# Patient Record
Sex: FEMALE | Race: WHITE | Employment: UNEMPLOYED | URBAN - METROPOLITAN AREA
[De-identification: names, ages, dates, MRNs, and addresses within clinical notes are randomized per-mention and may not be internally consistent; named-entity substitution may affect disease eponyms.]

---

## 2017-12-16 ENCOUNTER — TELEPHONE (OUTPATIENT)
Dept: PEDIATRICS | Facility: CLINIC | Age: 22
End: 2017-12-16

## 2018-01-15 ENCOUNTER — OFFICE VISIT (OUTPATIENT)
Dept: FAMILY MEDICINE | Facility: CLINIC | Age: 23
End: 2018-01-15
Payer: COMMERCIAL

## 2018-01-15 VITALS
TEMPERATURE: 99.4 F | WEIGHT: 132.8 LBS | HEART RATE: 88 BPM | HEIGHT: 59 IN | BODY MASS INDEX: 26.77 KG/M2 | DIASTOLIC BLOOD PRESSURE: 60 MMHG | SYSTOLIC BLOOD PRESSURE: 98 MMHG

## 2018-01-15 DIAGNOSIS — F43.22 ADJUSTMENT DISORDER WITH ANXIOUS MOOD: ICD-10-CM

## 2018-01-15 DIAGNOSIS — R53.82 CHRONIC FATIGUE: Primary | ICD-10-CM

## 2018-01-15 DIAGNOSIS — F43.21 ADJUSTMENT DISORDER WITH DEPRESSED MOOD: ICD-10-CM

## 2018-01-15 DIAGNOSIS — R20.0 NUMBNESS OF TOES: ICD-10-CM

## 2018-01-15 DIAGNOSIS — R41.840 DIFFICULTY CONCENTRATING: ICD-10-CM

## 2018-01-15 LAB — T4 FREE SERPL-MCNC: 0.83 NG/DL (ref 0.76–1.46)

## 2018-01-15 PROCEDURE — 84443 ASSAY THYROID STIM HORMONE: CPT | Performed by: NURSE PRACTITIONER

## 2018-01-15 PROCEDURE — 86800 THYROGLOBULIN ANTIBODY: CPT | Performed by: NURSE PRACTITIONER

## 2018-01-15 PROCEDURE — 82306 VITAMIN D 25 HYDROXY: CPT | Performed by: NURSE PRACTITIONER

## 2018-01-15 PROCEDURE — 99214 OFFICE O/P EST MOD 30 MIN: CPT | Performed by: NURSE PRACTITIONER

## 2018-01-15 PROCEDURE — 84480 ASSAY TRIIODOTHYRONINE (T3): CPT | Performed by: NURSE PRACTITIONER

## 2018-01-15 PROCEDURE — 36415 COLL VENOUS BLD VENIPUNCTURE: CPT | Performed by: NURSE PRACTITIONER

## 2018-01-15 PROCEDURE — 84439 ASSAY OF FREE THYROXINE: CPT | Performed by: NURSE PRACTITIONER

## 2018-01-15 PROCEDURE — 84481 FREE ASSAY (FT-3): CPT | Performed by: NURSE PRACTITIONER

## 2018-01-15 ASSESSMENT — PATIENT HEALTH QUESTIONNAIRE - PHQ9
5. POOR APPETITE OR OVEREATING: SEVERAL DAYS
SUM OF ALL RESPONSES TO PHQ QUESTIONS 1-9: 14

## 2018-01-15 ASSESSMENT — ANXIETY QUESTIONNAIRES
2. NOT BEING ABLE TO STOP OR CONTROL WORRYING: MORE THAN HALF THE DAYS
1. FEELING NERVOUS, ANXIOUS, OR ON EDGE: NEARLY EVERY DAY
GAD7 TOTAL SCORE: 10
5. BEING SO RESTLESS THAT IT IS HARD TO SIT STILL: NOT AT ALL
7. FEELING AFRAID AS IF SOMETHING AWFUL MIGHT HAPPEN: MORE THAN HALF THE DAYS
6. BECOMING EASILY ANNOYED OR IRRITABLE: NOT AT ALL
3. WORRYING TOO MUCH ABOUT DIFFERENT THINGS: MORE THAN HALF THE DAYS

## 2018-01-15 NOTE — MR AVS SNAPSHOT
After Visit Summary   1/15/2018    Lori Velásquez    MRN: [id number]           Patient Information     Date Of Birth          1995        Visit Information        Provider Department      1/15/2018 3:00 PM Pauline Sanchez APRN [facility]        Today's Diagnoses     Chronic fatigue    -  1    Numbness of toes        Adjustment disorder with depressed mood        Adjustment disorder with anxious mood        Difficulty concentrating          Care Instructions    For the numb feeling of the right great toe.     do some back exercises,   Does have  Toe exercises. Curling the toes.  Stretching,     Do some low back exercises, try to NOT sleep on your stomach.  Hug a pillow to keep you off your stomach.       For the anxiety   -- figure out the  Triggers for your anxiety and then figure out coping mechanisms     I feel it would be good for you to see a counselor .  I did give you a referral to  [facility]   You will need to call for an appointment     Did check the full thyroid panel   Results should be back by the end of the week                Follow-ups after your visit        Additional Services     PSYCHOLOGY REFERRAL       Your provider has referred you to:  [facility]    [facility] [address] [address]  [phone]      Please be aware that coverage of these services is subject to the terms and limitations of your health insurance plan.  Call member services at your health plan with any benefit or coverage questions.      Please bring the following to your appointment:    >>   Any x-rays, CTs or MRIs which have been performed.  Contact the facility where they were done to arrange for  prior to your scheduled appointment.   >>   List of current medications   >>   This referral request   >>   Any documents/labs given to you for this referral                  Who to contact     Normal or non-critical lab and imaging  "results will be communicated to you by MyChart, letter or phone within 4 business days after the clinic has received the results. If you do not hear from us within 7 days, please contact the clinic through [employer] or phone. If you have a critical or abnormal lab result, we will notify you by phone as soon as possible.  Submit refill requests through [employer] or call your pharmacy and they will forward the refill request to us. Please allow 3 business days for your refill to be completed.          If you need to speak with a  for additional information , please call: [phone]           Additional Information About Your Visit        Smart Adventure Information     [employer] lets you send messages to your doctor, view your test results, renew your prescriptions, schedule appointments and more. To sign up, go to [email or web address] . Click on \"Log in\" on the left side of the screen, which will take you to the Welcome page. Then click on \"Sign up Now\" on the right side of the page.     You will be asked to enter the access code listed below, as well as some personal information. Please follow the directions to create your username and password.     Your access code is: [email or web address]  Expires: 4/15/2018  4:04 PM     Your access code will  in 90 days. If you need help or a new code, please call your [place] clinic or [phone].        Care EveryWhere ID     This is your Care EveryWhere ID. This could be used by other organizations to access your [place] medical records  [id number]        Your Vitals Were     Pulse Temperature Height Last Period Breastfeeding? BMI (Body Mass Index)    88 99.4  F (37.4  C) (Tympanic) 4' 11.49\" (1.511 m) 2018 (Within Days) No 26.38 kg/m2       Blood Pressure from Last 3 Encounters:   01/15/18 98/60   07/28/15 104/74   10/20/11 100/64    Weight from Last 3 Encounters:   01/15/18 132 lb 12.8 oz (60.2 kg)   07/28/15 134 lb 6 oz (61 kg) (66 %)*   10/20/11 114 lb " 6.4 oz (51.9 kg) (52 %)*     * Growth percentiles are based on [facility] 2-20 Years data.              We Performed the Following     Anti thyroglobulin antibody     PSYCHOLOGY REFERRAL     T3, Free     T3, total     T4, free     TSH     Vitamin D Deficiency        Primary Care Provider Office Phone # Fax #    Marta Zuleta MD PhD [phone] [address] [facility]   [place] [address]        Equal Access to Services     [facility]: Hadii aad ku hadasho Soomaali, waaxda luqadaha, qaybta kaalmada adeegyada, waxay idiin hayaan adeeg khkeonsandra laisaías . So [facility] [phone].    ATENCIÓN: Si habla español, tiene a moreno disposición servicios gratuitos de asistencia lingüística. Llame al [phone].    We comply with applicable federal civil rights laws and [place] laws. We do not discriminate on the basis of race, color, national origin, age, disability, sex, sexual orientation, or gender identity.            Thank you!     Thank you for choosing [facility]  for your care. Our goal is always to provide you with excellent care. Hearing back from our patients is one way we can continue to improve our services. Please take a few minutes to complete the written survey that you may receive in the mail after your visit with us. Thank you!             Your Updated Medication List - Protect others around you: Learn how to safely use, store and throw away your medicines at www.disposemymeds.org.          This list is accurate as of: 1/15/18  4:04 PM.  Always use your most recent med list.                   Brand Name Dispense Instructions for use Diagnosis    NO ACTIVE MEDICATIONS      .

## 2018-01-15 NOTE — LETTER
January 18, 2018      Lori Velásquez  [address] [place] [address]        Dear ,    The results of your recent checks for thyroid function and body metabolism were normal.     The results of your recent Vitamin D were abnormal. Please get over the counter Vitamin D 5000 units and take this daily for the next 3-4 months, and then decrease to 2000 units daily.     Component      Latest Ref Rng & Units 1/15/2018   TSH      0.40 - 4.00 mU/L 2.96   T4 Free      0.76 - 1.46 ng/dL 0.83   Free T3      2.3 - 4.2 pg/mL 2.3   Triiodothyronine (T3)      60 - 181 ng/dL 98   Thyroglobulin Antibody      <40 IU/mL <20   Vitamin D Deficiency screening      20 - 75 ug/L 22       If you have any questions or concerns, please call the clinic at the number listed above.       Sincerely,        DIANA SOSA NP, APRN CNP / jg

## 2018-01-15 NOTE — PATIENT INSTRUCTIONS
For the numb feeling of the right great toe.     do some back exercises,   Does have  Toe exercises. Curling the toes.  Stretching,     Do some low back exercises, try to NOT sleep on your stomach.  Hug a pillow to keep you off your stomach.       For the anxiety   -- figure out the  Triggers for your anxiety and then figure out coping mechanisms     I feel it would be good for you to see a counselor .  I did give you a referral to  [facility]   You will need to call for an appointment     Did check the full thyroid panel   Results should be back by the end of the week

## 2018-01-16 LAB
DEPRECATED CALCIDIOL+CALCIFEROL SERPL-MC: 22 UG/L (ref 20–75)
T3 SERPL-MCNC: 98 NG/DL (ref 60–181)
T3FREE SERPL-MCNC: 2.3 PG/ML (ref 2.3–4.2)
THYROGLOB AB SERPL IA-ACNC: <20 IU/ML (ref 0–40)
TSH SERPL DL<=0.005 MIU/L-ACNC: 2.96 MU/L (ref 0.4–4)

## 2018-01-16 ASSESSMENT — ANXIETY QUESTIONNAIRES: GAD7 TOTAL SCORE: 10

## 2018-02-17 ENCOUNTER — HEALTH MAINTENANCE LETTER (OUTPATIENT)
Age: 23
End: 2018-02-17

## 2018-03-10 ENCOUNTER — HEALTH MAINTENANCE LETTER (OUTPATIENT)
Age: 23
End: 2018-03-10

## 2018-03-24 ENCOUNTER — HEALTH MAINTENANCE LETTER (OUTPATIENT)
Age: 23
End: 2018-03-24

## 2018-04-22 ENCOUNTER — VIRTUAL VISIT (OUTPATIENT)
Dept: FAMILY MEDICINE | Facility: OTHER | Age: 23
End: 2018-04-22

## 2018-04-22 NOTE — PROGRESS NOTES
"Date:   Clinician: Phoenix Taveras  Clinician NPI: [id number]  Patient: Lori Velásquez  Patient : 1995  Patient Address: [address] [place], [place] [address]  Patient Phone: [phone]  Visit Protocol: Ear pain  Patient Summary:  Lori is a 23 year old ( : 1995 ) female who initiated a Visit for swimmer's ear (ear pain). When asked the question \"Please sign me up to receive news, health information and promotions from [employer].\", Lori responded \"No\".    Lori uploaded images of her ear(s).   Lori reports that her ear pain started 5-7 days ago. The ear pain is located inside both ears.   In addition to the ear pain, Lori is experiencing a feeling of fullness in the ear(s). Lori reports having fluid draining from the ear(s).   Symptom Details     Pain: Lori is experiencing mild pain. It does not get worse when she gently pulls on the earlobe(s) and eats or chews.     Drainage: The color of the fluid coming out of her ear(s) is yellow, white, and clear. The fluid does not have a bad odor.      Lori denies itchiness, redness, and tenderness in the ear(s). She also denies feeling feverish, recent injuries near the ear(s), ever having ear tubes, and the possibility of a foreign object in the ear(s). Lori denies flying and swimming within the past week.    Weight: 135 lbs   She denies pregnancy and denies breastfeeding. She has menstruated in the past month.   She does not smoke or use smokeless tobacco.   Additional health information pertinent to this Visit as reported by the patient (free text): The ear pain started with an episode at work on the  where I felt feverish and faint/dizzy and I could pop my ears but the mild pressure would return instantly. It lasted about 20 minutes and resolved itself within a minute or 2. The same thing happened on the  and hasn't happened since then. Since then my eardrums have had a dull aching " feeling and there is some fluid but only at intervals and not enough to be bothersome. The left started hurting more than the right this afternoon.  MEDICATIONS:    Vitamin D3 oral  , ALLERGIES:   NKDA    Clinician Response:  Dear Lori,   I am sorry you are not feeling well. To determine the most appropriate care for you, I would like you to be seen in person to further discuss your health history and symptoms.  You will not be charged for this Visit. Thank you for trusting us with your care.   Diagnosis: Refer for additional evaluation  Diagnosis ICD: R69  Additional Clinician Notes: Drainage could indicate injury to the ear drums and should be directly evaluated in the office.

## 2018-04-23 ENCOUNTER — OFFICE VISIT (OUTPATIENT)
Dept: FAMILY MEDICINE | Facility: CLINIC | Age: 23
End: 2018-04-23
Payer: COMMERCIAL

## 2018-04-23 VITALS
TEMPERATURE: 98.5 F | BODY MASS INDEX: 27.3 KG/M2 | DIASTOLIC BLOOD PRESSURE: 66 MMHG | SYSTOLIC BLOOD PRESSURE: 110 MMHG | HEART RATE: 96 BPM | WEIGHT: 135.4 LBS | HEIGHT: 59 IN

## 2018-04-23 DIAGNOSIS — H69.93 DYSFUNCTION OF BOTH EUSTACHIAN TUBES: Primary | ICD-10-CM

## 2018-04-23 PROCEDURE — 99213 OFFICE O/P EST LOW 20 MIN: CPT | Performed by: FAMILY MEDICINE

## 2018-04-23 NOTE — MR AVS SNAPSHOT
"              After Visit Summary   4/23/2018    Lori Velásquez    MRN: [id number]           Patient Information     Date Of Birth          1995        Visit Information        Provider Department      4/23/2018 6:40 PM Zoë Dasilva, DO [facility]        Today's Diagnoses     Dysfunction of both eustachian tubes    -  1      Care Instructions    Looks like a congestion around the pressure equalizing tubes causing the ear symptoms.    No sign of infection.  You can try some flonase or nasonex nasal spray along with Sudafed if you would like.  Symptoms might take a few weeks to resolve.    If you have worsening ear pain or develop fever please seek additional care            Follow-ups after your visit        Who to contact     Normal or non-critical lab and imaging results will be communicated to you by [employer], letter or phone within 4 business days after the clinic has received the results. If you do not hear from us within 7 days, please contact the clinic through [employer] or phone. If you have a critical or abnormal lab result, we will notify you by phone as soon as possible.  Submit refill requests through [employer] or call your pharmacy and they will forward the refill request to us. Please allow 3 business days for your refill to be completed.          If you need to speak with a  for additional information , please call: [phone]           Additional Information About Your Visit        [employer] Information     [employer] lets you send messages to your doctor, view your test results, renew your prescriptions, schedule appointments and more. To sign up, go to [email or web address] . Click on \"Log in\" on the left side of the screen, which will take you to the Welcome page. Then click on \"Sign up Now\" on the right side of the page.     You will be asked to enter the access code listed below, as well as some personal information. Please follow the directions to create " "your username and password.     Your access code is: [email or web address]  Expires: 2018  7:18 PM     Your access code will  in 90 days. If you need help or a new code, please call your [facility] clinic or [phone].        Care EveryWhere ID     This is your Care EveryWhere ID. This could be used by other organizations to access your [facility] medical records  [id number]        Your Vitals Were     Pulse Temperature Height Breastfeeding? BMI (Body Mass Index)       96 98.5  F (36.9  C) (Tympanic) 4' 11\" (1.499 m) No 27.35 kg/m2        Blood Pressure from Last 3 Encounters:   18 110/66   01/15/18 98/60   07/28/15 104/74    Weight from Last 3 Encounters:   18 135 lb 6.4 oz (61.4 kg)   01/15/18 132 lb 12.8 oz (60.2 kg)   07/28/15 134 lb 6 oz (61 kg) (66 %)*     * Growth percentiles are based on CDC 2-20 Years data.              Today, you had the following     No orders found for display       Primary Care Provider Office Phone # Fax #    Marta Zuleta MD PhD [phone] [address] [place] [address]        Equal Access to Services     [facility]: Hadii wilson contreras hadasho Soomaali, waaxda luqadaha, qaybta kaalmada adeegyada, camryn chan hayleigha mckay . So [facility] [place] [facility] [phone].    ATENCIÓN: Si habla español, tiene a moreno disposición servicios gratuitos de asistencia lingüística. Llame al [phone].    We comply with applicable federal civil rights laws and [place] laws. We do not discriminate on the basis of race, color, national origin, age, disability, sex, sexual orientation, or gender identity.            Thank you!     Thank you for choosing [facility] [place]  for your care. Our goal is always to provide you with excellent care. Hearing back from our patients is one way we can continue to improve our services. Please take a few minutes to complete the written survey that you may receive in the mail after your visit with us. Thank you!           "   Your Updated Medication List - Protect others around you: Learn how to safely use, store and throw away your medicines at www.disposemymeds.org.          This list is accurate as of 4/23/18  7:18 PM.  Always use your most recent med list.                   Brand Name Dispense Instructions for use Diagnosis    cholecalciferol 5000 units Tabs tablet    vitamin D3     Take 5,000 Units by mouth daily        NO ACTIVE MEDICATIONS      .

## 2018-04-23 NOTE — NURSING NOTE
"Initial /66  Pulse 96  Temp 98.5  F (36.9  C) (Tympanic)  Ht 4' 11\" (1.499 m)  Wt 135 lb 6.4 oz (61.4 kg)  Breastfeeding? No  BMI 27.35 kg/m2 Estimated body mass index is 27.35 kg/(m^2) as calculated from the following:    Height as of this encounter: 4' 11\" (1.499 m).    Weight as of this encounter: 135 lb 6.4 oz (61.4 kg). .      "

## 2018-04-23 NOTE — PROGRESS NOTES
SUBJECTIVE:   Lori Velásquez is a 23 year old female who presents to clinic today for the following health issues:    ENT Symptoms             Symptoms: cc Present Absent Comment   Fever/Chills   x    Fatigue   x    Muscle Aches   x    Eye Irritation   x    Sneezing   x    Nasal Ander/Drg   x    Sinus Pressure/Pain  x     Loss of smell   x    Dental pain   x    Sore Throat   x    Swollen Glands   x    Ear Pain/Fullness x x  Bilateral ear pain, pressure, plugged, popping L>R   Cough   x    Wheeze   x    Chest Pain   x    Shortness of breath   x    Rash   x    Other   x      Symptom duration:  1 week   Symptom severity:  mild   Treatments tried:  none   Contacts:  none     Pt reports that ear discomfort began with an episode at work where she felt overheated and dizzy.  Episode lasted about 20 minutes and resolved completely.  Has not occurred since.  Ear pressure seemed to begin then and persist.      She has had some mild congestion but no other URI symptoms.  Ears feels plugged and pop off and on.  She denies any change in her hearing.    Problem list and histories reviewed & adjusted, as indicated.  Additional history: as documented    Reviewed and updated as needed this visit by clinical staff  Tobacco  Allergies  Meds  Med Hx  Surg Hx  Fam Hx  Soc Hx      Reviewed and updated as needed this visit by Provider  Tobacco  Med Hx  Surg Hx  Fam Hx  Soc Hx        ROS: Remainder of Constitutional, CV, Respiratory, GI,  negative with exception of that mentioned above    PE:  VS as above   Gen:  WN/WD/WH female in NAD   HEENT:  NC/AT, conjunctiva wnl, TM's wnl kolby pearly gray with good light reflex, small clear effusions present, oropharynx clear without    exudate/erythema   Neck:  supple, no LAD appreciated   Heart:  RRR without murmur, nl S1, S2, no rubs or gallops   Lungs CTA kolby without rales/ronchi/wheezes   Ext:  No pedal edema    A?P:      ICD-10-CM    1. Dysfunction of both eustachian tubes H69.83       Patient Instructions   Looks like a congestion around the pressure equalizing tubes causing the ear symptoms.    No sign of infection.  You can try some flonase or nasonex nasal spray along with Sudafed if you would like.  Symptoms might take a few weeks to resolve.    If you have worsening ear pain or develop fever please seek additional care

## 2018-04-24 NOTE — PATIENT INSTRUCTIONS
Looks like a congestion around the pressure equalizing tubes causing the ear symptoms.    No sign of infection.  You can try some flonase or nasonex nasal spray along with Sudafed if you would like.  Symptoms might take a few weeks to resolve.    If you have worsening ear pain or develop fever please seek additional care

## 2018-08-21 ENCOUNTER — OFFICE VISIT (OUTPATIENT)
Dept: FAMILY MEDICINE | Facility: CLINIC | Age: 23
End: 2018-08-21
Payer: COMMERCIAL

## 2018-08-21 VITALS
WEIGHT: 142 LBS | HEIGHT: 60 IN | SYSTOLIC BLOOD PRESSURE: 90 MMHG | HEART RATE: 84 BPM | RESPIRATION RATE: 24 BRPM | TEMPERATURE: 98.5 F | BODY MASS INDEX: 27.88 KG/M2 | DIASTOLIC BLOOD PRESSURE: 60 MMHG

## 2018-08-21 DIAGNOSIS — F43.22 ADJUSTMENT DISORDER WITH ANXIOUS MOOD: ICD-10-CM

## 2018-08-21 DIAGNOSIS — H69.93 DYSFUNCTION OF BOTH EUSTACHIAN TUBES: Primary | ICD-10-CM

## 2018-08-21 PROCEDURE — 99213 OFFICE O/P EST LOW 20 MIN: CPT | Performed by: FAMILY MEDICINE

## 2018-08-21 ASSESSMENT — PAIN SCALES - GENERAL: PAINLEVEL: NO PAIN (0)

## 2018-08-21 NOTE — PROGRESS NOTES
SUBJECTIVE:   Lori Velásquez is a 23 year old female who presents to clinic today for the following health issues:      ENT Symptoms             Symptoms: cc Present Absent Comment   Fever/Chills   x    Fatigue   x    Muscle Aches   x    Eye Irritation   x    Sneezing   x    Nasal Ander/Drg  x  Thinks allergy related   Sinus Pressure/Pain  x     Loss of smell   x    Dental pain   x    Sore Throat   x    Swollen Glands   x    Ear Pain/Fullness x x  Occasional brief, sharp pains in both ears. Hearing is decreased. Has to pop ears frequently.   Cough   x    Wheeze   x    Chest Pain   x    Shortness of breath   x    Rash   x    Other  x  Had food poisoning last week. Had diarrhea for 8 hours.      Symptom duration:  First started in March. Ears hurt for 20 minutes two different times. Now the pain is just occasional.   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 No known exposure to illness.      Feels she is having symptoms only 1-2 times per week.  Mildly troubling, would like it to resolve completely.    Has been taking her antihistamine regularly, does not feel it has impacted her ear symptoms.    After her visit in April she did do the nasal steroid for 1 month and did not notice much improvement      Problem list and histories reviewed & adjusted, as indicated.  Additional history: as documented      Reviewed and updated as needed this visit by clinical staff  Tobacco  Allergies  Meds  Problems  Med Hx  Surg Hx  Fam Hx  Soc Hx        Reviewed and updated as needed this visit by Provider  Tobacco  Allergies  Meds  Problems  Med Hx  Surg Hx  Fam Hx  Soc Hx          ROS: Remainder of Constitutional, CV, Respiratory, GI,  negative with exception of that mentioned above    PE:  VS as above   HEENT:  NC/AT, conjunctiva wnl, TM's wnl kolby pearly gray   with good light reflex, oropharynx clear without    exudate/erythema   Neck:  supple, no  LAD appreciated   Psych: Alert and oriented times 3; coherent speech, normal   rate and volume, able to articulate logical thoughts, able   to abstract reason, no tangential thoughts, no hallucinations   or delusions  Her affect is bright and appropriate    A/p:      ICD-10-CM    1. Dysfunction of both eustachian tubes H69.83 OTOLARYNGOLOGY REFERRAL   2. Adjustment disorder with anxious mood F43.22 DEPRESSION ACTION PLAN (DAP)     Patient Instructions   You can call to schedule with ENT to see what they would recommend for your ears.    Nexplanon can be placed here in clinic, we have a few different providers who do that procedure.  You can schedule when it works best for you.  Looks like you are also due for your pap smear

## 2018-08-21 NOTE — PATIENT INSTRUCTIONS
You can call to schedule with ENT to see what they would recommend for your ears.    Nexplanon can be placed here in clinic, we have a few different providers who do that procedure.  You can schedule when it works best for you.  Looks like you are also due for your pap smear

## 2018-08-21 NOTE — MR AVS SNAPSHOT
After Visit Summary   8/21/2018    Lori Velásquez    MRN: [id number]           Patient Information     Date Of Birth          1995        Visit Information        Provider Department      8/21/2018 9:00 AM Zoë Dasilva, DO [facility]        Today's Diagnoses     Dysfunction of both eustachian tubes    -  1    Adjustment disorder with anxious mood          Care Instructions    You can call to schedule with ENT to see what they would recommend for your ears.    Nexplanon can be placed here in clinic, we have a few different providers who do that procedure.  You can schedule when it works best for you.  Looks like you are also due for your pap smear          Follow-ups after your visit        Additional Services     OTOLARYNGOLOGY REFERRAL       Your provider has referred you to: [facility]: [facility] [place] [phone]   [email or web address]  FMG: [facility] [phone]   [email or web address]    Please be aware that coverage of these services is subject to the terms and limitations of your health insurance plan.  Call member services at your health plan with any benefit or coverage questions.      Please bring the following with you to your appointment:    (1) Any X-Rays, CTs or MRIs which have been performed.  Contact the facility where they were done to arrange for  prior to your scheduled appointment.   (2) List of current medications  (3) This referral request   (4) Any documents/labs given to you for this referral                  Who to contact     Normal or non-critical lab and imaging results will be communicated to you by MyChart, letter or phone within 4 business days after the clinic has received the results. If you do not hear from us within 7 days, please contact the clinic through MyChart or phone. If you have a critical or abnormal lab result, we will notify you by phone as soon  "as possible.  Submit refill requests through [email or web address] or call your pharmacy and they will forward the refill request to us. Please allow 3 business days for your refill to be completed.          If you need to speak with a  for additional information , please call: [phone]           Additional Information About Your Visit        Care EveryWhere ID     This is your Care EveryWhere ID. This could be used by other organizations to access your [facility] medical records  [id number]        Your Vitals Were     Pulse Temperature Respirations Height Last Period BMI (Body Mass Index)    84 98.5  F (36.9  C) (Tympanic) 24 4' 11.5\" (1.511 m) 08/20/2018 28.2 kg/m2       Blood Pressure from Last 3 Encounters:   08/21/18 90/60   04/23/18 110/66   01/15/18 98/60    Weight from Last 3 Encounters:   08/21/18 142 lb (64.4 kg)   04/23/18 135 lb 6.4 oz (61.4 kg)   01/15/18 132 lb 12.8 oz (60.2 kg)              We Performed the Following     DEPRESSION ACTION PLAN (DAP)     OTOLARYNGOLOGY REFERRAL        Primary Care Provider Office Phone # Fax #    Marta Zuleta MD PhD [phone] [address] [place] [address]        Equal Access to Services     [facility]: Hadii aad ku hadasho Soomaali, waaxda luqadaha, qaybta kaalmada adeegyada, waxay idiin hayaan adelamberto khadonis mckay . So [facility] [phone].    ATENCIÓN: Si habla español, tiene a moreno disposición servicios gratuitos de asistencia lingüística. Llame al [phone].    We comply with applicable federal civil rights laws and [place] laws. We do not discriminate on the basis of race, color, national origin, age, disability, sex, sexual orientation, or gender identity.            Thank you!     Thank you for choosing [facility] [place]  for your care. Our goal is always to provide you with excellent care. Hearing back from our patients is one way we can continue to improve our services. Please take a few minutes to complete " the written survey that you may receive in the mail after your visit with us. Thank you!             Your Updated Medication List - Protect others around you: Learn how to safely use, store and throw away your medicines at www.disposemymeds.org.          This list is accurate as of 8/21/18  9:33 AM.  Always use your most recent med list.                   Brand Name Dispense Instructions for use Diagnosis    cholecalciferol 5000 units Tabs tablet    vitamin D3     Take 5,000 Units by mouth daily        loratadine-pseudoePHEDrine  MG per 24 hr tablet    CLARITIN-D 24-hour     Take 1 tablet by mouth daily        NO ACTIVE MEDICATIONS      .

## 2018-08-22 ASSESSMENT — PATIENT HEALTH QUESTIONNAIRE - PHQ9: SUM OF ALL RESPONSES TO PHQ QUESTIONS 1-9: 4

## 2018-09-25 ENCOUNTER — OFFICE VISIT (OUTPATIENT)
Dept: OTOLARYNGOLOGY | Facility: CLINIC | Age: 23
End: 2018-09-25
Payer: COMMERCIAL

## 2018-09-25 ENCOUNTER — OFFICE VISIT (OUTPATIENT)
Dept: AUDIOLOGY | Facility: CLINIC | Age: 23
End: 2018-09-25
Payer: COMMERCIAL

## 2018-09-25 VITALS
HEIGHT: 60 IN | HEART RATE: 82 BPM | OXYGEN SATURATION: 99 % | TEMPERATURE: 97.1 F | BODY MASS INDEX: 28.27 KG/M2 | SYSTOLIC BLOOD PRESSURE: 119 MMHG | WEIGHT: 144 LBS | DIASTOLIC BLOOD PRESSURE: 74 MMHG

## 2018-09-25 DIAGNOSIS — H69.93 DISORDER OF BOTH EUSTACHIAN TUBES: Primary | ICD-10-CM

## 2018-09-25 DIAGNOSIS — H69.93 DYSFUNCTION OF BOTH EUSTACHIAN TUBES: Primary | ICD-10-CM

## 2018-09-25 PROCEDURE — 99203 OFFICE O/P NEW LOW 30 MIN: CPT | Performed by: OTOLARYNGOLOGY

## 2018-09-25 PROCEDURE — 92556 SPEECH AUDIOMETRY COMPLETE: CPT | Performed by: AUDIOLOGIST

## 2018-09-25 PROCEDURE — 99207 ZZC NO CHARGE LOS: CPT | Performed by: AUDIOLOGIST

## 2018-09-25 PROCEDURE — 92567 TYMPANOMETRY: CPT | Performed by: AUDIOLOGIST

## 2018-09-25 PROCEDURE — 92552 PURE TONE AUDIOMETRY AIR: CPT | Performed by: AUDIOLOGIST

## 2018-09-25 NOTE — PATIENT INSTRUCTIONS
General Scheduling Information  To schedule your CT/MRI scan, please contact [place] at [phone]   [address] [place] [address]    To schedule your Surgery, please contact our Specialty Schedulers at [phone]    ENT Clinic Locations Clinic Hours Telephone Number     Colleen Reeder  [address] [place] [address]   Tuesday:       8:00am -- 4:00pm    Wednesday:  8:00am - 4:00pm   To schedule an appointment with   Dr. Bell,   please contact our   Specialty Scheduling Department at:     [phone]       Colleen Alex  [address] [place] [address]   Friday:          8:00am - 4:00pm         Urgent Care Locations Clinic Hours Telephone Numbers     Colleen Velasco  [address] [place] [address]     Monday-Friday:     11:00pm - 9:00pm    Saturday-Sunday:  9:00am - 5:00pm   [phone]     Colleen Alex  [address] [place] [address]     Monday-Friday:      5:00pm - 9:00pm     Saturday-Sunday:  9:00am - 5:00pm   [phone]

## 2018-09-25 NOTE — LETTER
9/25/2018         RE: Lori Velásquez  [address] [place] [address]        Dear Colleague,    Thank you for referring your patient, Lori Velásquez, to the [facility]. Please see a copy of my visit note below.    Chief Complaint - plugged ear    History of Present Illness - Lori Velásquez is a 23 year old female who presents to me today with pressure or a plugged feeling in both ears.  It has been present and noticeable for approximately 5-6 months.  This was preceded by an upper respiratory infection. There is no history of chronic ear disease or ear surgery. Has some allergies. Takes claritin. The patient has had some dizziness at times. Some ear popping. No otalgia. The patient has tried flonase and sudafed. Sudafed helps more.    Past Medical History -   Patient Active Problem List   Diagnosis     Adjustment disorder with anxious mood       Current Medications -   Current Outpatient Prescriptions:      cholecalciferol (VITAMIN D3) 5000 units TABS tablet, Take 5,000 Units by mouth daily, Disp: , Rfl:      loratadine-pseudoePHEDrine (CLARITIN-D 24-HOUR)  MG per 24 hr tablet, Take 1 tablet by mouth daily, Disp: , Rfl:      NO ACTIVE MEDICATIONS, ., Disp: , Rfl:     Allergies - No Known Allergies    Social History -   Social History     Social History     Marital status: Single     Spouse name: N/A     Number of children: N/A     Years of education: N/A     Social History Main Topics     Smoking status: Never Smoker     Smokeless tobacco: Never Used     Alcohol use Yes      Comment: social     Drug use: No     Sexual activity: No     Other Topics Concern     None     Social History Narrative       Family History -   Family History   Problem Relation Age of Onset     Hypertension Paternal Grandfather      Diabetes Paternal Grandfather      Respiratory Paternal Grandfather      emphysema     Diabetes Maternal Grandfather      Thyroid Disease Other      maternal cousin with  "hypothyroidism     Family History Negative Mother      Family History Negative Father      Hypertension Maternal Grandmother      Cancer Maternal Grandmother      Basal cell skin cancer     Macular Degeneration Maternal Grandmother      Family History Negative Paternal Grandmother      hypoglycemia       Review of Systems - As per HPI and PMHx, otherwise 7 system review of the head and neck negative.    Physical Exam  /74  Pulse 82  Temp 97.1  F (36.2  C) (Oral)  Ht 1.511 m (4' 11.5\")  Wt 65.3 kg (144 lb)  SpO2 99%  BMI 28.6 kg/m2  General - The patient is nontoxic, in no distress.  Alert and oriented to person and place, answers questions and cooperates with examination appropriately.   Voice and Breathing - The patient was breathing comfortably without the use of accessory muscles. There was no wheezing, stridor, or stertor.  The patients voice was clear and strong.  Ears - The tympanic membrane on the right is intact, no obvious middle ear effusion. No acute infection.  No fluid or purulence was seen in the external canal. The tympanic membrane on the left is intact, no middle ear effusion. No acute infection.  No fluid or purulence was seen in the external canal.   Nose - Septum midline. Turbinates congested. Airway patent bilaterally. No polyps, masses, or pus.   Eyes - Extraocular movements intact. Sclera were not icteric or injected.  Mouth - Examination of the oral cavity showed pink, healthy mucosa. No lesions or ulcerations noted.  The tongue was mobile and midline.  Throat - The walls of the oropharynx were smooth, symmetric, and had no lesions or ulcerations.  The uvula was midline on elevation.    Neck - Palpation of the occipital, submental, submandibular, internal jugular chain, and supraclavicular nodes did not demonstrateany abnormal lymph nodes or masses. No parotid masses. Palpation of the thyroid was soft and smooth, with no nodules or goiter appreciated.  The trachea was mobile and " midline.  Neurological - Cranial nerves 2 through 12 were grossly intact. House-Brackmann grade 1 out of 6 bilaterally.    Audiologic Studies - An audiogram and tympanogram were performed today as part of the evaluation and personally reviewed. The tympanogram shows a type A, low volume in both ears.  The audiogram was normal.     Assessment and Plan - Lori Velásquez is a 23 year old female who presents to me today with a plugged ear.  This is most consistent with eustachian tube dysfunction in both ears. I have advised trying sudafed, daily valsalva, and allergy medication to try and improve the eustachian tube function. There maybe a component of TMJ and we discussed ways to manage this as well. Return as needed.    José Luis Bell MD  Otolaryngology  [facility]    Again, thank you for allowing me to participate in the care of your patient.        Sincerely,        José Luis Bell MD

## 2018-09-25 NOTE — PROGRESS NOTES
AUDIOLOGY REPORT:    Patient was referred to Audiology from ENT by Dr. Bell for a hearing examination. Patient reports ongoing bilateral plugged feeling and slight decline in hearing which is worse when she is at work. 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 Patient reports this past weekend she experienced a sudden pop then some otalgia in her left ear for approximately 30 seconds. Once the otalgia passed she felt some drainage in her left ear. She thought perhaps it was blood but when she checked it was not. Patient reports after the pop and otalgia the hearing improved in her left ear. They were accompanied today by their self.    Testing:    Otoscopy:   Otoscopic exam indicates ears are clear of cerumen bilaterally     Tympanograms:    RIGHT: normal eardrum mobility     LEFT:   normal eardrum mobility    Thresholds:   Pure Tone Thresholds assessed using conventional audiometry with good  reliability from 250-8000 Hz bilaterally using insert earphones     RIGHT:  normal hearing sensitivity for all frequencies tested.     LEFT:    normal hearing sensitivity for all frequencies tested.     Speech Reception Threshold:    RIGHT: 10 dB HL    LEFT:   10 dB HL    Word Recognition Score:     RIGHT: 100% at 50 dB HL using NU-6 recorded word list.    LEFT:   100% at 50 dB HL using NU-6 recorded word list.    Discussed results with the patient.     Patient was returned to ENT for follow up.     Rancho Hillman CCC-A  Licensed Audiologist  9/25/2018

## 2018-09-25 NOTE — MR AVS SNAPSHOT
After Visit Summary   9/25/2018    Lori Velásquez    MRN: [id number]           Patient Information     Date Of Birth          1995        Visit Information        Provider Department      9/25/2018 9:45 AM [facility]        Today's Diagnoses     Disorder of both eustachian tubes    -  1       Follow-ups after your visit        Who to contact     If you have questions or need follow up information about today's clinic visit or your schedule please contact [facility] directly at [phone].  Normal or non-critical lab and imaging results will be communicated to you by MyChart, letter or phone within 4 business days after the clinic has received the results. If you do not hear from us within 7 days, please contact the clinic through MyChart or phone. If you have a critical or abnormal lab result, we will notify you by phone as soon as possible.  Submit refill requests through [employer] or call your pharmacy and they will forward the refill request to us. Please allow 3 business days for your refill to be completed.          Additional Information About Your Visit        Care EveryWhere ID     This is your Care EveryWhere ID. This could be used by other organizations to access your [facility] medical records  [id number]         Blood Pressure from Last 3 Encounters:   09/25/18 119/74   08/21/18 90/60   04/23/18 110/66    Weight from Last 3 Encounters:   09/25/18 144 lb (65.3 kg)   08/21/18 142 lb (64.4 kg)   04/23/18 135 lb 6.4 oz (61.4 kg)              We Performed the Following     AUDIOGRAM/TYMPANOGRAM - INTERFACE     PURE TONE AUDIOMETRY, AIR     SPEECH AUDIOMETRY, COMPLETE     TYMPANOMETRY        Primary Care Provider Office Phone # Fax #    Marta Zuleta MD PhD [phone] [address] [place] [address]        Equal Access to Services     DOREEN THURSTON AH: Tod Davies, tona doty, concepcion  camryn morenolamberto mckay ah. So [facility] [phone].    ATENCIÓN: Si luiz diaz, tiene a moreno disposición servicios gratuitos de asistencia lingüística. Allan al [phone].    We comply with applicable federal civil rights laws and [place] laws. We do not discriminate on the basis of race, color, national origin, age, disability, sex, sexual orientation, or gender identity.            Thank you!     Thank you for choosing [facility]  for your care. Our goal is always to provide you with excellent care. Hearing back from our patients is one way we can continue to improve our services. Please take a few minutes to complete the written survey that you may receive in the mail after your visit with us. Thank you!             Your Updated Medication List - Protect others around you: Learn how to safely use, store and throw away your medicines at www.disposemymeds.org.          This list is accurate as of 9/25/18 10:22 AM.  Always use your most recent med list.                   Brand Name Dispense Instructions for use Diagnosis    cholecalciferol 5000 units Tabs tablet    vitamin D3     Take 5,000 Units by mouth daily        loratadine-pseudoePHEDrine  MG per 24 hr tablet    CLARITIN-D 24-hour     Take 1 tablet by mouth daily        NO ACTIVE MEDICATIONS      .

## 2018-09-25 NOTE — PROGRESS NOTES
Chief Complaint - plugged ear    History of Present Illness - Lori Velásquez is a 23 year old female who presents to me today with pressure or a plugged feeling in both ears.  It has been present and noticeable for approximately 5-6 months.  This was preceded by an upper respiratory infection. There is no history of chronic ear disease or ear surgery. Has some allergies. Takes claritin. The patient has had some dizziness at times. Some ear popping. No otalgia. The patient has tried flonase and sudafed. Sudafed helps more.    Past Medical History -   Patient Active Problem List   Diagnosis     Adjustment disorder with anxious mood       Current Medications -   Current Outpatient Prescriptions:      cholecalciferol (VITAMIN D3) 5000 units TABS tablet, Take 5,000 Units by mouth daily, Disp: , Rfl:      loratadine-pseudoePHEDrine (CLARITIN-D 24-HOUR)  MG per 24 hr tablet, Take 1 tablet by mouth daily, Disp: , Rfl:      NO ACTIVE MEDICATIONS, ., Disp: , Rfl:     Allergies - No Known Allergies    Social History -   Social History     Social History     Marital status: Single     Spouse name: N/A     Number of children: N/A     Years of education: N/A     Social History Main Topics     Smoking status: Never Smoker     Smokeless tobacco: Never Used     Alcohol use Yes      Comment: social     Drug use: No     Sexual activity: No     Other Topics Concern     None     Social History Narrative       Family History -   Family History   Problem Relation Age of Onset     Hypertension Paternal Grandfather      Diabetes Paternal Grandfather      Respiratory Paternal Grandfather      emphysema     Diabetes Maternal Grandfather      Thyroid Disease Other      maternal cousin with hypothyroidism     Family History Negative Mother      Family History Negative Father      Hypertension Maternal Grandmother      Cancer Maternal Grandmother      Basal cell skin cancer     Macular Degeneration Maternal Grandmother      Family History  "Negative Paternal Grandmother      hypoglycemia       Review of Systems - As per HPI and PMHx, otherwise 7 system review of the head and neck negative.    Physical Exam  /74  Pulse 82  Temp 97.1  F (36.2  C) (Oral)  Ht 1.511 m (4' 11.5\")  Wt 65.3 kg (144 lb)  SpO2 99%  BMI 28.6 kg/m2  General - The patient is nontoxic, in no distress.  Alert and oriented to person and place, answers questions and cooperates with examination appropriately.   Voice and Breathing - The patient was breathing comfortably without the use of accessory muscles. There was no wheezing, stridor, or stertor.  The patients voice was clear and strong.  Ears - The tympanic membrane on the right is intact, no obvious middle ear effusion. No acute infection.  No fluid or purulence was seen in the external canal. The tympanic membrane on the left is intact, no middle ear effusion. No acute infection.  No fluid or purulence was seen in the external canal.   Nose - Septum midline. Turbinates congested. Airway patent bilaterally. No polyps, masses, or pus.   Eyes - Extraocular movements intact. Sclera were not icteric or injected.  Mouth - Examination of the oral cavity showed pink, healthy mucosa. No lesions or ulcerations noted.  The tongue was mobile and midline.  Throat - The walls of the oropharynx were smooth, symmetric, and had no lesions or ulcerations.  The uvula was midline on elevation.    Neck - Palpation of the occipital, submental, submandibular, internal jugular chain, and supraclavicular nodes did not demonstrateany abnormal lymph nodes or masses. No parotid masses. Palpation of the thyroid was soft and smooth, with no nodules or goiter appreciated.  The trachea was mobile and midline.  Neurological - Cranial nerves 2 through 12 were grossly intact. House-Brackmann grade 1 out of 6 bilaterally.    Audiologic Studies - An audiogram and tympanogram were performed today as part of the evaluation and personally reviewed. The " tympanogram shows a type A, low volume in both ears.  The audiogram was normal.     Assessment and Plan - Lori Velásquez is a 23 year old female who presents to me today with a plugged ear.  This is most consistent with eustachian tube dysfunction in both ears. I have advised trying sudafed, daily valsalva, and allergy medication to try and improve the eustachian tube function. There maybe a component of TMJ and we discussed ways to manage this as well. Return as needed.    José Luis Bell MD  [facility]  [facility]

## 2018-09-25 NOTE — MR AVS SNAPSHOT
After Visit Summary   9/25/2018    Lori Velásquez    MRN: [id number]           Patient Information     Date Of Birth          1995        Visit Information        Provider Department      9/25/2018 10:15 AM José Luis Bell MD [facility]        Today's Diagnoses     Dysfunction of both eustachian tubes    -  1      Care Instructions    General Scheduling Information  To schedule your CT/MRI scan, please contact [place] at [phone] [address] [place] [address]    To schedule your Surgery, please contact our Specialty Schedulers at [phone]    ENT Clinic Locations Clinic Hours Telephone Number     [facility]  [address]  [place] [address]   Tuesday:       8:00am -- 4:00pm    Wednesday:  8:00am - 4:00pm   To schedule an appointment with   Dr. Bell,   please contact our   Specialty Scheduling Department at:     [phone]       [facility]  [address] [place] [address]   Friday:          8:00am - 4:00pm         Urgent Care Locations Clinic Hours Telephone Numbers     [facility] [place]  [address] [place] [address]     Monday-Friday:     11:00pm - 9:00pm    Saturday-Sunday:  9:00am - 5:00pm   [phone]     [facility]  [address] [place] [address]     Monday-Friday:      5:00pm - 9:00pm     Saturday-Sunday:  9:00am - 5:00pm   [phone]                   Follow-ups after your visit        Additional Services     AUDIOLOGY ADULT REFERRAL       Your provider has referred you to: [facility]: [facility] [place] [phone]   [email or web address]    Treatment:  Evaluation & Treatment  Specialty Testing:  Audiogram w/Tymps and Reflexes    Please be aware that coverage of these services is subject to the terms and limitations of your health insurance plan.  Call member services at your health plan with any benefit or coverage questions.      Please  "bring the following to your appointment:  >>   Any x-rays, CTs or MRIs which have been performed.  Contact the facility where they were done to arrange for  prior to your scheduled appointment.   >>   List of current medications  >>   This referral request   >>   Any documents/labs given to you for this referral                  Who to contact     If you have questions or need follow up information about today's clinic visit or your schedule please contact [facility] directly at [phone].  Normal or non-critical lab and imaging results will be communicated to you by MyChart, letter or phone within 4 business days after the clinic has received the results. If you do not hear from us within 7 days, please contact the clinic through MyChart or phone. If you have a critical or abnormal lab result, we will notify you by phone as soon as possible.  Submit refill requests through [employer] or call your pharmacy and they will forward the refill request to us. Please allow 3 business days for your refill to be completed.          Additional Information About Your Visit        Care EveryWhere ID     This is your Care EveryWhere ID. This could be used by other organizations to access your [place] medical records  [id number]        Your Vitals Were     Pulse Temperature Height Pulse Oximetry BMI (Body Mass Index)       82 97.1  F (36.2  C) (Oral) 1.511 m (4' 11.5\") 99% 28.6 kg/m2        Blood Pressure from Last 3 Encounters:   09/25/18 119/74   08/21/18 90/60   04/23/18 110/66    Weight from Last 3 Encounters:   09/25/18 65.3 kg (144 lb)   08/21/18 64.4 kg (142 lb)   04/23/18 61.4 kg (135 lb 6.4 oz)              We Performed the Following     AUDIOLOGY ADULT REFERRAL        Primary Care Provider Office Phone # Fax #    Marta Zuleta MD PhD [phone] [address] [place] [address]        Equal Access to Services     DOREEN THURSTON AH: Hadii tona Funk " concepcion dotyfrancygalindo liconapj waxjia dilanin hayaan adeeg kharash la'aan ah. So [facility] [phone].    ATENCIÓN: Si luiz diaz, tiene a moreno disposición servicios gratuitos de asistencia lingüística. Allan al [phone].    We comply with applicable federal civil rights laws and [place] laws. We do not discriminate on the basis of race, color, national origin, age, disability, sex, sexual orientation, or gender identity.            Thank you!     Thank you for choosing [facility]  for your care. Our goal is always to provide you with excellent care. Hearing back from our patients is one way we can continue to improve our services. Please take a few minutes to complete the written survey that you may receive in the mail after your visit with us. Thank you!             Your Updated Medication List - Protect others around you: Learn how to safely use, store and throw away your medicines at www.disposemymeds.org.          This list is accurate as of 9/25/18  1:42 PM.  Always use your most recent med list.                   Brand Name Dispense Instructions for use Diagnosis    cholecalciferol 5000 units Tabs tablet    vitamin D3     Take 5,000 Units by mouth daily        loratadine-pseudoePHEDrine  MG per 24 hr tablet    CLARITIN-D 24-hour     Take 1 tablet by mouth daily        NO ACTIVE MEDICATIONS      .

## 2018-12-11 ENCOUNTER — OFFICE VISIT (OUTPATIENT)
Dept: FAMILY MEDICINE | Facility: CLINIC | Age: 23
End: 2018-12-11
Payer: COMMERCIAL

## 2018-12-11 VITALS
HEART RATE: 108 BPM | TEMPERATURE: 97.4 F | BODY MASS INDEX: 28.31 KG/M2 | HEIGHT: 60 IN | WEIGHT: 144.2 LBS | SYSTOLIC BLOOD PRESSURE: 122 MMHG | DIASTOLIC BLOOD PRESSURE: 66 MMHG

## 2018-12-11 DIAGNOSIS — R41.840 ATTENTION DISTURBANCE: Primary | ICD-10-CM

## 2018-12-11 PROCEDURE — 99213 OFFICE O/P EST LOW 20 MIN: CPT | Performed by: FAMILY MEDICINE

## 2018-12-11 ASSESSMENT — MIFFLIN-ST. JEOR: SCORE: 1332.65
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
## 2018-12-11 NOTE — PATIENT INSTRUCTIONS
You should be getting a call in 1-2 days to help you get scheduled for the testing we discussed.    We should follow up to review your results once that is completed.

## 2018-12-11 NOTE — NURSING NOTE
"Initial /66   Pulse 108   Temp 97.4  F (36.3  C) (Tympanic)   Ht 1.511 m (4' 11.5\")   Wt 65.4 kg (144 lb 3.2 oz)   LMP 12/10/2018 (Exact Date)   Breastfeeding? No   BMI 28.64 kg/m   Estimated body mass index is 28.64 kg/m  as calculated from the following:    Height as of this encounter: 1.511 m (4' 11.5\").    Weight as of this encounter: 65.4 kg (144 lb 3.2 oz). .      "

## 2018-12-11 NOTE — LETTER
To whom it may concern,       Lori Velásquez is currently a patient under my care and will be attending your program in the fall.  She had academic issues during her previous college program due to a concussion that occurred in fall 2014 with symptoms that persisted through her freshman year of college.   Her concussion symptoms have essentially resolved at this point.    There is ongoing concern for a possible attention issue like ADHD and she is currently undergoing evaluation for this concern.     Please let us know if additional information is needed.    Sincerely,         Dr. Zoë Dasilva, DO

## 2019-02-13 ENCOUNTER — OFFICE VISIT (OUTPATIENT)
Dept: PSYCHOLOGY | Facility: CLINIC | Age: 24
End: 2019-02-13
Payer: COMMERCIAL

## 2019-02-13 DIAGNOSIS — F90.0 ADHD (ATTENTION DEFICIT HYPERACTIVITY DISORDER), INATTENTIVE TYPE: Primary | ICD-10-CM

## 2019-02-13 PROCEDURE — 90834 PSYTX W PT 45 MINUTES: CPT | Performed by: PSYCHOLOGIST

## 2019-02-15 ASSESSMENT — ANXIETY QUESTIONNAIRES
5. BEING SO RESTLESS THAT IT IS HARD TO SIT STILL: NOT AT ALL
GAD7 TOTAL SCORE: 6
2. NOT BEING ABLE TO STOP OR CONTROL WORRYING: NOT AT ALL
6. BECOMING EASILY ANNOYED OR IRRITABLE: SEVERAL DAYS
1. FEELING NERVOUS, ANXIOUS, OR ON EDGE: SEVERAL DAYS
7. FEELING AFRAID AS IF SOMETHING AWFUL MIGHT HAPPEN: SEVERAL DAYS
4. TROUBLE RELAXING: SEVERAL DAYS
3. WORRYING TOO MUCH ABOUT DIFFERENT THINGS: MORE THAN HALF THE DAYS

## 2019-02-15 ASSESSMENT — PATIENT HEALTH QUESTIONNAIRE - PHQ9: SUM OF ALL RESPONSES TO PHQ QUESTIONS 1-9: 14
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
## 2019-02-16 ASSESSMENT — ANXIETY QUESTIONNAIRES: GAD7 TOTAL SCORE: 6

## 2019-02-26 ENCOUNTER — OFFICE VISIT (OUTPATIENT)
Dept: PSYCHOLOGY | Facility: CLINIC | Age: 24
End: 2019-02-26
Payer: COMMERCIAL

## 2019-02-26 DIAGNOSIS — F90.0 ADHD (ATTENTION DEFICIT HYPERACTIVITY DISORDER), INATTENTIVE TYPE: Primary | ICD-10-CM

## 2019-02-26 PROCEDURE — 96130 PSYCL TST EVAL PHYS/QHP 1ST: CPT | Performed by: PSYCHOLOGIST

## 2019-02-26 PROCEDURE — 90791 PSYCH DIAGNOSTIC EVALUATION: CPT | Performed by: PSYCHOLOGIST
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
## 2019-03-13 ENCOUNTER — OFFICE VISIT (OUTPATIENT)
Dept: PSYCHOLOGY | Facility: CLINIC | Age: 24
End: 2019-03-13
Payer: COMMERCIAL

## 2019-03-13 DIAGNOSIS — F90.0 ADHD (ATTENTION DEFICIT HYPERACTIVITY DISORDER), INATTENTIVE TYPE: Primary | ICD-10-CM

## 2019-03-13 PROCEDURE — 96130 PSYCL TST EVAL PHYS/QHP 1ST: CPT | Performed by: PSYCHOLOGIST

## 2019-03-13 PROCEDURE — 90834 PSYTX W PT 45 MINUTES: CPT | Performed by: PSYCHOLOGIST

## 2019-03-15 NOTE — PROGRESS NOTES
Progress Note  Disclaimer: This note consists of symbols derived from keyboarding, dictation and/or voice recognition software. As a result, there may be errors in the script that have gone undetected. Please consider this when interpreting information found in this chart.    Client Name: Lori Velásquez  Date: 3/13/2019         Service Type: Individual      Session Start Time: 2:00 PM  session End Time: 2:45 PM      Session Length: 45     Session #: 3     Attendees: Client attended alone    Treatment Plan Last Reviewed: 3/13/2019  PHQ-9 / AGUILA-7 : See flow sheets     DATA   ADHD Assessment feedback session. Reviewed results of testing. See [facility] extended documentation for results. Explained diagnosis and treatment options. Recommended medication evaluation be scheduled with PCP. Also provided and reviewed ADHD patient handout. Pt will schedule follow-up with me after seeing PCP.     Progress Since Last Session (Related to Symptoms / Goals / Homework):   Symptoms: No change Stable    Homework: Achieved / completed to satisfaction      Episode of Care Goals: Achieved / completed to satisfaction - ACTION (Actively working towards change); Intervened by reinforcing change plan / affirming steps taken     Current / Ongoing Stressors and Concerns:   Difficulty with attention and concentration     Treatment Objective(s) Addressed in This Session:   Reviewed results of testing, provided ADHD Psychoeducation tips and resources, encouraged client to make appointment for medication evaluation.       Intervention:   psychoeducation regarding ADHD        ASSESSMENT: Current Emotional / Mental Status (status of significant symptoms):   Risk status (Self / Other harm or suicidal ideation)   Client denies current fears or concerns for personal safety.   Client denies current or recent suicidal ideation or behaviors.   Client denies current or recent homicidal ideation or  behaviors.   Client denies current or recent self injurious behavior or ideation.   Client denies other safety concerns.   A safety and risk management plan has not been developed at this time, however client was given the after-hours number / 911 should there be a change in any of these risk factors.     Appearance:   Appropriate    Eye Contact:   Good    Psychomotor Behavior: Normal    Attitude:   Cooperative    Orientation:   All   Speech    Rate / Production: Normal     Volume:  Normal    Mood:    Normal   Affect:    Appropriate    Thought Content:  Clear    Thought Form:  Coherent  Logical    Insight:    Good      Medication Review:   No current psychiatric medications prescribed     Medication Compliance:   NA     Changes in Health Issues:   None reported     Chemical Use Review:   Substance Use: Chemical use reviewed, no active concerns identified      Tobacco Use: No current tobacco use.       Collateral Reports Completed:   Not Applicable    PLAN: (Client Tasks / Therapist Tasks / Other)  CC CC  The documentation for testing results.  Client to schedule medication evaluation with her primary physician and then see me back in 4-6 weeks.        Franko Sauceda

## 2019-03-19 ENCOUNTER — OFFICE VISIT (OUTPATIENT)
Dept: FAMILY MEDICINE | Facility: CLINIC | Age: 24
End: 2019-03-19
Payer: COMMERCIAL

## 2019-03-19 VITALS
TEMPERATURE: 98.5 F | SYSTOLIC BLOOD PRESSURE: 118 MMHG | BODY MASS INDEX: 29.23 KG/M2 | DIASTOLIC BLOOD PRESSURE: 72 MMHG | HEIGHT: 59 IN | HEART RATE: 116 BPM | WEIGHT: 145 LBS

## 2019-03-19 DIAGNOSIS — F98.8 ATTENTION DEFICIT DISORDER (ADD) WITHOUT HYPERACTIVITY: Primary | ICD-10-CM

## 2019-03-19 DIAGNOSIS — Z23 NEED FOR VACCINATION: ICD-10-CM

## 2019-03-19 PROCEDURE — 90715 TDAP VACCINE 7 YRS/> IM: CPT | Performed by: FAMILY MEDICINE

## 2019-03-19 PROCEDURE — 99213 OFFICE O/P EST LOW 20 MIN: CPT | Mod: 25 | Performed by: FAMILY MEDICINE

## 2019-03-19 PROCEDURE — 90471 IMMUNIZATION ADMIN: CPT | Performed by: FAMILY MEDICINE

## 2019-03-19 ASSESSMENT — MIFFLIN-ST. JEOR: SCORE: 1327.31

## 2019-03-19 NOTE — NURSING NOTE
"Initial /72   Pulse 116   Temp 98.5  F (36.9  C) (Tympanic)   Ht 1.505 m (4' 11.25\")   Wt 65.8 kg (145 lb)   Breastfeeding? No   BMI 29.04 kg/m   Estimated body mass index is 29.04 kg/m  as calculated from the following:    Height as of this encounter: 1.505 m (4' 11.25\").    Weight as of this encounter: 65.8 kg (145 lb). .      "

## 2019-03-19 NOTE — PROGRESS NOTES
SUBJECTIVE:   Lori Velásquez is a 24 year old female who presents to clinic today for the following health issues:    * new diagnosis of ADHD, has been seeing psychology    Had formal ADD testing done through psychology.  Testing was consistent with inattentive ADD.  Reviewed reports from psychology.    Pt is here today to discuss options.    Problem list and histories reviewed & adjusted, as indicated.  Additional history: as documented    Reviewed and updated as needed this visit by clinical staff  Tobacco  Allergies  Med Hx  Surg Hx  Fam Hx  Soc Hx      Reviewed and updated as needed this visit by Provider  Tobacco  Allergies  Med Hx  Surg Hx  Fam Hx  Soc Hx        ROS: Remainder of Constitutional, CV, Respiratory, GI,  negative with exception of that mentioned above    PE:  VS as above   Gen:  WN/WD/WH female in NAD   Psych: Alert and oriented times 3; coherent speech, normal   rate and volume, able to articulate logical thoughts, able   to abstract reason, no tangential thoughts, no hallucinations   or delusions  Her affect is bright and appropriate    A/P:      ICD-10-CM    1. Attention deficit disorder (ADD) without hyperactivity F98.8 MENTAL HEALTH REFERRAL  - Adult; Outpatient Treatment; Individual/Couples/Family/Group Therapy/Health Psychology[place] [facility] [place] [facility] [phone]; We will contact you to schedule the appointment or please call with any questions   2. Need for vaccination Z23 ADMIN 1st VACCINE     Reviewed with pt.  We discussed behavioral interventions for ADD symptoms to improve focus and task completion.  Pt the testing psychologist, he felt that pt's symptoms were on the mild end and behavioral intervention would  Likely work well.    Pt is planning on returning to school in the fall and would like to be on track by then.  We reviewed beginning with psychology support and considering medication if she did not feel it was effective.    Reviewed with pt possible  side effects of stimulant medications as well as potential nonstimulant options.    Patient Instructions   We'll have you follow up with Franko to begin some behavioral intervention for your ADD.  We can touch base in 2-3 months and see how things are going.      20 minutes of 20 minute visit spent reviewing diagnostic testing and management options as above

## 2019-03-19 NOTE — PATIENT INSTRUCTIONS
We'll have you follow up with Franko to begin some behavioral intervention for your ADD.  We can touch base in 2-3 months and see how things are going.

## 2019-03-20 NOTE — PROGRESS NOTES
SUBJECTIVE:   Lori Velásquez is a 24 year old female who presents to clinic today for the following health issues:      Chief Complaint   Patient presents with     Contraception     Nexplanon placement     Declines pap today. Is not sexually active. Wants Nexplanon to try to stop periods.     Problem list and histories reviewed & adjusted, as indicated.  Additional history: as documented    Current Outpatient Medications   Medication Sig Dispense Refill     cholecalciferol (VITAMIN D3) 5000 units TABS tablet Take 5,000 Units by mouth daily       Triprolidine-Pseudoephedrine (ANTIHISTAMINE PO) Take by mouth daily       No Known Allergies    Reviewed and updated as needed this visit by clinical staff  Tobacco  Allergies  Meds  Med Hx  Surg Hx  Fam Hx  Soc Hx      Reviewed and updated as needed this visit by Provider          Here today for Nexplanon placement.  Has never had a Pap, declines wanting here today.  Is agreeable to coming back in the future to get.  Is not currently sexually active-reports has never been.  Would like Nexplanon to try to stop periods.  Currently periods come fairly regular.  Last one was March 6, 2019.    ROS:  Review of Systems   Constitutional: Negative for chills, diaphoresis, fatigue and fever.   HENT: Negative for ear discharge, ear pain, hearing loss, rhinorrhea, sinus pressure and sore throat.    Eyes: Negative for discharge and itching.   Respiratory: Negative for cough, shortness of breath and wheezing.    Gastrointestinal: Negative for constipation, diarrhea and nausea.   Skin: Negative for rash.   Neurological: Negative for dizziness, light-headedness and headaches.         OBJECTIVE:     BP 92/58 (BP Location: Left arm, Patient Position: Sitting, Cuff Size: Adult Regular)   Pulse 100   Temp 97.7  F (36.5  C) (Tympanic)   Resp 16   Wt 66 kg (145 lb 9.6 oz)   LMP 03/06/2019   BMI 29.16 kg/m    Body mass index is 29.16 kg/m .  Physical Exam  No PE completed  today    I full discussion of Nexplanon including possible complications such as neural or vascular injury that may result in pain, paresthesia, bleeding, hematoma, scarring and infection was completed.  She will likely experience changes in menstrual bleeding pattern.      Patient has no contraindications to using nexplanon (ie pregnancy, current of past h/o thromboembolic disorder, liver tumor or active liver disease, undiagnosed abnormal genital bleeding, breast cancer, or allergic reaction to any of the components of nexplanon).      Procedure Note - Etonogestrel Implant Insertion     HPI: Lori Velásquez is here for Nexplanon (etonogestrel implant) insertion.   Indication: unwanted fertility  BP 92/58 (BP Location: Left arm, Patient Position: Sitting, Cuff Size: Adult Regular)   Pulse 100   Temp 97.7  F (36.5  C) (Tympanic)   Resp 16   Wt 66 kg (145 lb 9.6 oz)   LMP 03/06/2019   BMI 29.16 kg/m    Previous STD testing in the past 1 year? (if no, please send to lab for vaginal swab)    Labs: UPT negative    Prev Contraception? none  Smoking?  No    Counselling and Consent:  Affirmation of informed consent was signed and scanned into the medical record. Risks, benefits and alternatives were discussed. Discussed potential side effects of the etonogestrel implant including the risk of irregular bleeding that may persist across the 3 yrs of use.  Instructed on use of condoms for STI prevention.  Patient's questions were elicited and answered.            Preoperative Diagnosis:  Unwanted fertility  Postoperative Diagnosis:  same     Technique:   Skin prep Betadine  Anesthesia 1% lidocaine, with epi  EBL:   minimal  Complications: No  Tolerance:  Pt tolerated procedure well and was in stable condition.     Pt was positioned on exam table with left arm flexed and externally rotated. Area was marked for insertion 8cm frm the medial epicondyle along the sulcus between the biceps and triceps. Anesthesia provided at  the insertion site and along the insertion track and then the area was prepped with betadine. Etonogestrel implant was then inserted subdermally in usual fashion. Provider and patient confirmed placement by palpating the device. Pressure dressing applied and procedure complete.     Follow up:  Pt was instructed to call if bleeding, severe pain or foul smell.  Instructed to remove pressure dressing after 24 hours, then may keep insertion site covered with a bandaid until it is healed.  Instructed that she requires removal or replacement of the device in 3 years.      ASSESSMENT/PLAN:   1. Encounter for initial prescription of implantable subdermal contraceptive  Educated on care of area.  Informed her that may have spotting or irregular bleeding for up to 6 months.  Notify if this occurs as there are interventions.  Informed her that may still have regular periods  - etonogestrel (IMPLANON/NEXPLANON) subdermal implant 68 mg  - ETONOGESTREL IMPLANT SYSTEM  - INSERTION NON-BIODEGRADABLE DRUG DELIVERY IMPLANT  - Neisseria gonorrhoeae PCR  - Chlamydia trachomatis PCR  - HCG qualitative urine        CODY Evans [facility]

## 2019-03-22 ENCOUNTER — OFFICE VISIT (OUTPATIENT)
Dept: FAMILY MEDICINE | Facility: CLINIC | Age: 24
End: 2019-03-22
Payer: COMMERCIAL

## 2019-03-22 VITALS
HEART RATE: 100 BPM | SYSTOLIC BLOOD PRESSURE: 92 MMHG | RESPIRATION RATE: 16 BRPM | TEMPERATURE: 97.7 F | WEIGHT: 145.6 LBS | DIASTOLIC BLOOD PRESSURE: 58 MMHG | BODY MASS INDEX: 29.16 KG/M2

## 2019-03-22 DIAGNOSIS — Z30.017 ENCOUNTER FOR INITIAL PRESCRIPTION OF IMPLANTABLE SUBDERMAL CONTRACEPTIVE: Primary | ICD-10-CM

## 2019-03-22 LAB — HCG UR QL: NEGATIVE

## 2019-03-22 PROCEDURE — 11981 INSERTION DRUG DLVR IMPLANT: CPT | Performed by: NURSE PRACTITIONER

## 2019-03-22 PROCEDURE — 99213 OFFICE O/P EST LOW 20 MIN: CPT | Mod: 25 | Performed by: NURSE PRACTITIONER

## 2019-03-22 PROCEDURE — 87591 N.GONORRHOEAE DNA AMP PROB: CPT | Performed by: NURSE PRACTITIONER

## 2019-03-22 PROCEDURE — 87491 CHLMYD TRACH DNA AMP PROBE: CPT | Performed by: NURSE PRACTITIONER

## 2019-03-22 PROCEDURE — 81025 URINE PREGNANCY TEST: CPT | Performed by: NURSE PRACTITIONER

## 2019-03-22 ASSESSMENT — PAIN SCALES - GENERAL: PAINLEVEL: NO PAIN (0)

## 2019-03-22 ASSESSMENT — ENCOUNTER SYMPTOMS
RHINORRHEA: 0
CONSTIPATION: 0
EYE DISCHARGE: 0
EYE ITCHING: 0
DIAPHORESIS: 0
NAUSEA: 0
DIARRHEA: 0
WHEEZING: 0
SHORTNESS OF BREATH: 0
DIZZINESS: 0
COUGH: 0
SINUS PRESSURE: 0
FEVER: 0
CHILLS: 0
HEADACHES: 0
LIGHT-HEADEDNESS: 0
SORE THROAT: 0
FATIGUE: 0

## 2019-03-22 NOTE — PATIENT INSTRUCTIONS
Return for pap    You may remove the pressure bandage after 24 hours.  Keep the area covered with a Band-Aid until it is completely healed, typically that is 5-7 days total.  Okay to shower, do not submerge in water-no tub baths, swimming, hot tubs.    Contact the clinic immediately if you develop any signs of infection such as warmth, redness, fever or discharge from the area.     You should use condoms to protect against unwanted pregnancy for 7 days after the date of insertion.

## 2019-03-24 LAB
C TRACH DNA SPEC QL NAA+PROBE: NEGATIVE
N GONORRHOEA DNA SPEC QL NAA+PROBE: NEGATIVE
SPECIMEN SOURCE: NORMAL
SPECIMEN SOURCE: NORMAL

## 2019-07-25 ENCOUNTER — TELEPHONE (OUTPATIENT)
Dept: FAMILY MEDICINE | Facility: CLINIC | Age: 24
End: 2019-07-25

## 2019-07-25 PROBLEM — F90.2 ATTENTION DEFICIT HYPERACTIVITY DISORDER (ADHD), COMBINED TYPE: Status: ACTIVE | Noted: 2019-07-25

## 2019-07-25 NOTE — LETTER
July 25, 2019      Lori Velásquez  [address] [place] [address]        To Whom It May Concern:    Lori Velásquez has been evaluated and has been diagnosed with an underlying medical condition called ADHD, Attention Deficit Hyperactive Disorder.  Please let us know if you have any clarifying questions.     Sincerely,          Dr. Juan Odom

## 2019-07-25 NOTE — TELEPHONE ENCOUNTER
Patient is requesting a letter for her ADHD diagnoses for school.    Sommer Logan [facility]
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
